# Patient Record
Sex: MALE | Race: WHITE | NOT HISPANIC OR LATINO | URBAN - METROPOLITAN AREA
[De-identification: names, ages, dates, MRNs, and addresses within clinical notes are randomized per-mention and may not be internally consistent; named-entity substitution may affect disease eponyms.]

---

## 2023-09-25 ENCOUNTER — HOSPITAL ENCOUNTER (EMERGENCY)
Facility: HOSPITAL | Age: 38
Discharge: PSYCHIATRIC OR SUBSTANCE USE FACILITY | End: 2023-09-26
Attending: EMERGENCY MEDICINE
Payer: MEDICAID

## 2023-09-25 DIAGNOSIS — F29 PSYCHOSIS, UNSPECIFIED PSYCHOSIS TYPE: Primary | ICD-10-CM

## 2023-09-25 LAB
ALBUMIN SERPL BCP-MCNC: 4 G/DL (ref 3.5–5.2)
ALP SERPL-CCNC: 82 U/L (ref 55–135)
ALT SERPL W/O P-5'-P-CCNC: 23 U/L (ref 10–44)
AMPHET+METHAMPHET UR QL: ABNORMAL
ANION GAP SERPL CALC-SCNC: 7 MMOL/L (ref 8–16)
APAP SERPL-MCNC: <3 UG/ML (ref 10–20)
AST SERPL-CCNC: 24 U/L (ref 10–40)
BACTERIA #/AREA URNS AUTO: ABNORMAL /HPF
BARBITURATES UR QL SCN>200 NG/ML: NEGATIVE
BASOPHILS # BLD AUTO: 0.04 K/UL (ref 0–0.2)
BASOPHILS NFR BLD: 0.3 % (ref 0–1.9)
BENZODIAZ UR QL SCN>200 NG/ML: NEGATIVE
BILIRUB SERPL-MCNC: 1 MG/DL (ref 0.1–1)
BILIRUB UR QL STRIP: NEGATIVE
BUN SERPL-MCNC: 30 MG/DL (ref 6–20)
BZE UR QL SCN: ABNORMAL
CALCIUM SERPL-MCNC: 9 MG/DL (ref 8.7–10.5)
CANNABINOIDS UR QL SCN: ABNORMAL
CHLORIDE SERPL-SCNC: 107 MMOL/L (ref 95–110)
CLARITY UR REFRACT.AUTO: CLEAR
CO2 SERPL-SCNC: 25 MMOL/L (ref 23–29)
COLOR UR AUTO: YELLOW
CREAT SERPL-MCNC: 0.9 MG/DL (ref 0.5–1.4)
CREAT UR-MCNC: 317 MG/DL (ref 23–375)
DIFFERENTIAL METHOD: ABNORMAL
EOSINOPHIL # BLD AUTO: 0.3 K/UL (ref 0–0.5)
EOSINOPHIL NFR BLD: 2 % (ref 0–8)
ERYTHROCYTE [DISTWIDTH] IN BLOOD BY AUTOMATED COUNT: 12.5 % (ref 11.5–14.5)
EST. GFR  (NO RACE VARIABLE): >60 ML/MIN/1.73 M^2
ETHANOL SERPL-MCNC: <10 MG/DL
GLUCOSE SERPL-MCNC: 88 MG/DL (ref 70–110)
GLUCOSE UR QL STRIP: NEGATIVE
HCT VFR BLD AUTO: 46.6 % (ref 40–54)
HGB BLD-MCNC: 16.1 G/DL (ref 14–18)
HGB UR QL STRIP: NEGATIVE
HYALINE CASTS UR QL AUTO: 0 /LPF
IMM GRANULOCYTES # BLD AUTO: 0.04 K/UL (ref 0–0.04)
IMM GRANULOCYTES NFR BLD AUTO: 0.3 % (ref 0–0.5)
KETONES UR QL STRIP: ABNORMAL
LEUKOCYTE ESTERASE UR QL STRIP: NEGATIVE
LYMPHOCYTES # BLD AUTO: 2.6 K/UL (ref 1–4.8)
LYMPHOCYTES NFR BLD: 17.9 % (ref 18–48)
MCH RBC QN AUTO: 30.6 PG (ref 27–31)
MCHC RBC AUTO-ENTMCNC: 34.5 G/DL (ref 32–36)
MCV RBC AUTO: 89 FL (ref 82–98)
METHADONE UR QL SCN>300 NG/ML: NEGATIVE
MICROSCOPIC COMMENT: ABNORMAL
MONOCYTES # BLD AUTO: 1.3 K/UL (ref 0.3–1)
MONOCYTES NFR BLD: 8.8 % (ref 4–15)
NEUTROPHILS # BLD AUTO: 10.1 K/UL (ref 1.8–7.7)
NEUTROPHILS NFR BLD: 70.7 % (ref 38–73)
NITRITE UR QL STRIP: NEGATIVE
NRBC BLD-RTO: 0 /100 WBC
OPIATES UR QL SCN: NEGATIVE
PCP UR QL SCN>25 NG/ML: NEGATIVE
PH UR STRIP: 6 [PH] (ref 5–8)
PLATELET # BLD AUTO: 343 K/UL (ref 150–450)
PMV BLD AUTO: 9.3 FL (ref 9.2–12.9)
POTASSIUM SERPL-SCNC: 3.9 MMOL/L (ref 3.5–5.1)
PROT SERPL-MCNC: 6.7 G/DL (ref 6–8.4)
PROT UR QL STRIP: ABNORMAL
RBC # BLD AUTO: 5.26 M/UL (ref 4.6–6.2)
RBC #/AREA URNS AUTO: 5 /HPF (ref 0–4)
SODIUM SERPL-SCNC: 139 MMOL/L (ref 136–145)
SP GR UR STRIP: >1.03 (ref 1–1.03)
TOXICOLOGY INFORMATION: ABNORMAL
TSH SERPL DL<=0.005 MIU/L-ACNC: 1.88 UIU/ML (ref 0.4–4)
URN SPEC COLLECT METH UR: ABNORMAL
WBC # BLD AUTO: 14.21 K/UL (ref 3.9–12.7)
WBC #/AREA URNS AUTO: 14 /HPF (ref 0–5)

## 2023-09-25 PROCEDURE — 85025 COMPLETE CBC W/AUTO DIFF WBC: CPT

## 2023-09-25 PROCEDURE — 80307 DRUG TEST PRSMV CHEM ANLYZR: CPT

## 2023-09-25 PROCEDURE — 80143 DRUG ASSAY ACETAMINOPHEN: CPT

## 2023-09-25 PROCEDURE — 87086 URINE CULTURE/COLONY COUNT: CPT

## 2023-09-25 PROCEDURE — 84443 ASSAY THYROID STIM HORMONE: CPT

## 2023-09-25 PROCEDURE — 99285 EMERGENCY DEPT VISIT HI MDM: CPT | Mod: 25

## 2023-09-25 PROCEDURE — S4991 NICOTINE PATCH NONLEGEND: HCPCS

## 2023-09-25 PROCEDURE — 63600175 PHARM REV CODE 636 W HCPCS

## 2023-09-25 PROCEDURE — 82077 ASSAY SPEC XCP UR&BREATH IA: CPT

## 2023-09-25 PROCEDURE — 96374 THER/PROPH/DIAG INJ IV PUSH: CPT

## 2023-09-25 PROCEDURE — 80053 COMPREHEN METABOLIC PANEL: CPT

## 2023-09-25 PROCEDURE — 25000003 PHARM REV CODE 250

## 2023-09-25 PROCEDURE — 81001 URINALYSIS AUTO W/SCOPE: CPT

## 2023-09-25 PROCEDURE — 96361 HYDRATE IV INFUSION ADD-ON: CPT

## 2023-09-25 RX ORDER — CEPHALEXIN 500 MG/1
500 CAPSULE ORAL EVERY 12 HOURS
Qty: 10 CAPSULE | Refills: 0 | Status: SHIPPED | OUTPATIENT
Start: 2023-09-25 | End: 2023-09-30

## 2023-09-25 RX ORDER — IBUPROFEN 200 MG
1 TABLET ORAL
Status: DISCONTINUED | OUTPATIENT
Start: 2023-09-25 | End: 2023-09-26 | Stop reason: HOSPADM

## 2023-09-25 RX ORDER — LORAZEPAM 2 MG/ML
1 INJECTION INTRAMUSCULAR
Status: COMPLETED | OUTPATIENT
Start: 2023-09-25 | End: 2023-09-25

## 2023-09-25 RX ORDER — CEPHALEXIN 500 MG/1
500 CAPSULE ORAL
Status: COMPLETED | OUTPATIENT
Start: 2023-09-25 | End: 2023-09-26

## 2023-09-25 RX ADMIN — SODIUM CHLORIDE 1000 ML: 9 INJECTION, SOLUTION INTRAVENOUS at 08:09

## 2023-09-25 RX ADMIN — Medication 1 PATCH: at 09:09

## 2023-09-25 RX ADMIN — LORAZEPAM 1 MG: 2 INJECTION INTRAMUSCULAR; INTRAVENOUS at 08:09

## 2023-09-26 VITALS
TEMPERATURE: 98 F | DIASTOLIC BLOOD PRESSURE: 59 MMHG | OXYGEN SATURATION: 96 % | HEART RATE: 72 BPM | SYSTOLIC BLOOD PRESSURE: 137 MMHG | RESPIRATION RATE: 18 BRPM

## 2023-09-26 PROCEDURE — 25000003 PHARM REV CODE 250

## 2023-09-26 RX ADMIN — CEPHALEXIN 500 MG: 500 CAPSULE ORAL at 12:09

## 2023-09-26 NOTE — ED PROVIDER NOTES
"Encounter Date: 9/25/2023       History     Chief Complaint   Patient presents with    Mental Health Problem     Pt brought in by [employer] for aggressive behavior running in and out of [employer], throwing rocks at cars. Pt denies SI but admits he "wants to hurt other people." Pt reports he has hx of Bipolar/ schizophrenia but has been off medication "for years." Pt denies auditory/ visual hallucinations. Admits to methamphetamine use 2 days ago. Denies ETOH.  Pt AAOX4.      37-year-old male with past medical history of bipolar (reports previous antipsychotic use but denies current use) now brought in by police for aggressive behavior.  Patient was reportedly throwing bricks and articles of clothing at by passing cars.  Additionally patient was reported to be running in and out of [employer].  Upon arrival to emergency department patient is cooperative but endorses wanting to hurt other people.  Patient reports that he lost use amphetamines 3 days ago but denies any other drug use today including cocaine or alcohol.  Patient denies any suicidality.  Additionally patient denies any other physical symptoms including headaches, dizziness, chest pain, shortness of breath, nausea/vomiting, bowel changes, or dysuria.    The history is provided by the patient and the police.     Review of patient's allergies indicates:  No Known Allergies  History reviewed. No pertinent past medical history.  History reviewed. No pertinent surgical history.  History reviewed. No pertinent family history.  Social History     Tobacco Use    Smoking status: Every Day     Current packs/day: 1.00     Types: Cigarettes   Substance Use Topics    Alcohol use: Yes    Drug use: Yes     Types: Marijuana, Methamphetamines     Review of Systems  See HPI    Physical Exam     Initial Vitals [09/25/23 1945]   BP Pulse Resp Temp SpO2   (!) 140/91 88 16 98.6 °F (37 °C) 99 %      MAP       --         Physical Exam    Nursing note and vitals reviewed.      Gen: " AxOx3, well nourished, appears stated age, no pallor, no jaundice, appears well hydrated  Eye: EOMI, no scleral icterus, no periorbital edema or ecchymosis  Head: Normocephalic, atraumatic, no lesions, scalp appears normal  ENT: Neck supple, no stridor, no masses, no drooling or voice changes  CVS: All distal pulses intact with normal rate and rhythm, no JVD, normal S1/S2, no murmur  Pulm: Normal breath sounds, no wheezes, rales or rhonchi, no increased work of breathing  Abd:  Nondistended, soft, nontender, no organomegaly, no CVAT  Ext: No edema, no lesions, rashes, or deformity  Neuro: GCS15, moving all extremities, gait intact, face grossly symmetric  Psych:   Appearance:  Unkempt  Behavior:  Cooperative but appears uncomfortable with being in hospital and writhing around with mild agitation   Cognition:  Intact  Speech:  Pressure  Thought:  Grossly linear  Mood/affect:  Elevated  Psychomotor:  Psychomotor agitation present  Insight:  Decent, as patient endorses that he knows his cessation of medication and drug use causes some of his symptoms  Hallucinations:  Not objectively hallucinating  Delusions:  Denies  Suicidality:  Denies but endorses wanting to hurt others      ED Course   Procedures  Labs Reviewed   CBC W/ AUTO DIFFERENTIAL - Abnormal; Notable for the following components:       Result Value    WBC 14.21 (*)     Gran # (ANC) 10.1 (*)     Mono # 1.3 (*)     Lymph % 17.9 (*)     All other components within normal limits   COMPREHENSIVE METABOLIC PANEL - Abnormal; Notable for the following components:    BUN 30 (*)     Anion Gap 7 (*)     All other components within normal limits   URINALYSIS, REFLEX TO URINE CULTURE - Abnormal; Notable for the following components:    Specific Gravity, UA >1.030 (*)     Protein, UA 1+ (*)     Ketones, UA 1+ (*)     All other components within normal limits    Narrative:     Specimen Source->Urine   ACETAMINOPHEN LEVEL - Abnormal; Notable for the following components:     Acetaminophen (Tylenol), Serum <3.0 (*)     All other components within normal limits   URINALYSIS MICROSCOPIC - Abnormal; Notable for the following components:    RBC, UA 5 (*)     WBC, UA 14 (*)     Bacteria Few (*)     All other components within normal limits    Narrative:     Specimen Source->Urine   CULTURE, URINE   ALCOHOL,MEDICAL (ETHANOL)   TSH   DRUG SCREEN PANEL, URINE EMERGENCY          Imaging Results    None          Medications   nicotine 21 mg/24 hr 1 patch (1 patch Transdermal Patch Applied 9/25/23 2101)   sodium chloride 0.9% bolus 1,000 mL 1,000 mL (1,000 mLs Intravenous New Bag 9/25/23 2055)   LORazepam injection 1 mg (1 mg Intravenous Given 9/25/23 2053)     Medical Decision Making  Initial assessment  37-year-old male brought in by police for aggressive behavior. Patient is able to vocalise, breathing spontaneously, hemodynamically stable, oriented, moving all 4 limbs spontaneously.  Examination consistent with agitated behavior but cooperative.      Differential diagnosis  Psychosis secondary to  - drug use  - Bipolar not on medication  - organic causes including electrolyte/metabolic derangements      ED management  Given the patient was agitated with concern for aggressive behavior and reports ongoing homicidal ideation I decided to pec patient and give Ativan.  Patient was cooperative.  Routine psychiatric workup including CBC, CMP, UA, alcohol, Tylenol within normal limits.  Patient was medically cleared and ready for psychiatric facility placement.      Amount and/or Complexity of Data Reviewed  Labs: ordered. Decision-making details documented in ED Course.    Risk  OTC drugs.  Prescription drug management.               ED Course as of 09/25/23 2157   Mon Sep 25, 2023   2135 Comprehensive metabolic panel(!)  Grossly benign mild BORIS.  Patient receiving IV fluids [PM]   2135 WBC(!): 14.21 [PM]   2135 Gran # (ANC)(!): 10.1 [PM]   2135 CBC auto differential(!)  Consistent with  leukocytosis with left shift.  Likely stress response patient is asymptomatic otherwise. [PM]   2135 Alcohol, Serum: <10 [PM]   2156 Urinalysis, Reflex to Urine Culture Urine, Clean Catch(!)  No UTI [PM]      ED Course User Index  [PM] Jimbo Ley MD       Medically cleared for psychiatry placement: 9/25/2023  9:55 PM            Clinical Impression:   Final diagnoses:  [F29] Psychosis, unspecified psychosis type (Primary)        ED Disposition Condition    Transfer to Psych Facility Stable          ED Prescriptions    None       Follow-up Information    None          Jimbo Ley MD  Resident  09/25/23 [id number]

## 2023-09-26 NOTE — ED NOTES
"Awake briefly for VS & medication. Asked if aware of whereabouts & states, " the hospital." Patient making verbal groans & grunts.Sitter states has been making these noises all night. DVC maintained for safety, sitter present. Awaiting transfer.  "

## 2023-09-26 NOTE — ED NOTES
The patient is escorted off the unit via [facility] per EDT & hospital security. One belongings bag & one valuable bag given to [facility] staff. The patient departed w/o any complaints.

## 2023-09-26 NOTE — ED TRIAGE NOTES
"Alvin Tirado, a 37 y.o. male presents to the ED w/ complaint of    Mental Health Problem (Pt brought in by [facility] for aggressive behavior running in and out of [employer], throwing rocks at cars. Pt denies SI but admits he "wants to hurt other people." Pt reports he has hx of Bipolar/ schizophrenia but has been off medication "for years." Pt denies auditory/ visual hallucinations. Pt AAOX4, calm, cooperative in EMS triage. )       Triage note:  Chief Complaint   Patient presents with    Mental Health Problem     Pt brought in by [facility] for aggressive behavior running in and out of [employer], throwing rocks at cars. Pt denies SI but admits he "wants to hurt other people." Pt reports he has hx of Bipolar/ schizophrenia but has been off medication "for years." Pt denies auditory/ visual hallucinations. Admits to methamphetamine use 2 days ago. Denies ETOH.  Pt AAOX4.      Review of patient's allergies indicates:  Not on File  No past medical history on file.   "

## 2023-09-26 NOTE — ED NOTES
The patient is recv'd lying in bed w/ eyes closed, rise/fall of chest noted. The bed is in the lowest locked position w/ both side rails in the upright position. DVC is maintained for safety, sitter present.

## 2023-09-27 PROBLEM — D72.829 LEUKOCYTOSIS: Status: ACTIVE | Noted: 2023-09-27

## 2023-09-27 PROBLEM — Z13.9 ENCOUNTER FOR MEDICAL SCREENING EXAMINATION: Status: ACTIVE | Noted: 2023-09-27

## 2023-09-27 LAB — BACTERIA UR CULT: NO GROWTH

## 2023-09-28 PROBLEM — R63.8 INADEQUATE ORAL INTAKE: Status: ACTIVE | Noted: 2023-09-28

## 2023-09-29 PROBLEM — F31.63 BIPOLAR 1 DISORDER, MIXED, SEVERE: Status: ACTIVE | Noted: 2023-09-29

## 2024-01-01 PROBLEM — Z13.9 ENCOUNTER FOR MEDICAL SCREENING EXAMINATION: Status: RESOLVED | Noted: 2023-09-27 | Resolved: 2024-01-01
